# Patient Record
Sex: FEMALE | Race: WHITE | NOT HISPANIC OR LATINO | Employment: UNEMPLOYED | URBAN - METROPOLITAN AREA
[De-identification: names, ages, dates, MRNs, and addresses within clinical notes are randomized per-mention and may not be internally consistent; named-entity substitution may affect disease eponyms.]

---

## 2023-06-05 ENCOUNTER — APPOINTMENT (EMERGENCY)
Dept: RADIOLOGY | Facility: HOSPITAL | Age: 38
End: 2023-06-05
Payer: COMMERCIAL

## 2023-06-05 ENCOUNTER — HOSPITAL ENCOUNTER (EMERGENCY)
Facility: HOSPITAL | Age: 38
Discharge: LEFT BEFORE DISCHARGE | End: 2023-06-05
Attending: STUDENT IN AN ORGANIZED HEALTH CARE EDUCATION/TRAINING PROGRAM
Payer: COMMERCIAL

## 2023-06-05 VITALS
RESPIRATION RATE: 27 BRPM | HEART RATE: 94 BPM | TEMPERATURE: 97.8 F | OXYGEN SATURATION: 99 % | DIASTOLIC BLOOD PRESSURE: 73 MMHG | SYSTOLIC BLOOD PRESSURE: 120 MMHG
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
## 2023-06-05 LAB
ABO GROUP BLD: NORMAL
ALBUMIN SERPL BCP-MCNC: 3.7 G/DL (ref 3.5–5)
ALP SERPL-CCNC: 45 U/L (ref 46–116)
ALT SERPL W P-5'-P-CCNC: 20 U/L (ref 12–78)
ANION GAP SERPL CALCULATED.3IONS-SCNC: 3 MMOL/L (ref 4–13)
APAP SERPL-MCNC: <2 UG/ML (ref 10–20)
AST SERPL W P-5'-P-CCNC: 27 U/L (ref 5–45)
BASE EXCESS BLDA CALC-SCNC: 0 MMOL/L (ref -2–3)
BASOPHILS # BLD AUTO: 0.03 THOUSANDS/ÂΜL (ref 0–0.1)
BASOPHILS NFR BLD AUTO: 0 % (ref 0–1)
BILIRUB SERPL-MCNC: 0.64 MG/DL (ref 0.2–1)
BLD GP AB SCN SERPL QL: NEGATIVE
BUN SERPL-MCNC: 9 MG/DL (ref 5–25)
CA-I BLD-SCNC: 1.11 MMOL/L (ref 1.12–1.32)
CALCIUM SERPL-MCNC: 8.5 MG/DL (ref 8.3–10.1)
CHLORIDE SERPL-SCNC: 112 MMOL/L (ref 96–108)
CO2 SERPL-SCNC: 24 MMOL/L (ref 21–32)
CREAT SERPL-MCNC: 0.85 MG/DL (ref 0.6–1.3)
EOSINOPHIL # BLD AUTO: 0.05 THOUSAND/ÂΜL (ref 0–0.61)
EOSINOPHIL NFR BLD AUTO: 1 % (ref 0–6)
ERYTHROCYTE [DISTWIDTH] IN BLOOD BY AUTOMATED COUNT: 12.4 % (ref 11.6–15.1)
ETHANOL SERPL-MCNC: <3 MG/DL (ref 0–3)
GFR SERPL CREATININE-BSD FRML MDRD: 87 ML/MIN/1.73SQ M
GLUCOSE SERPL-MCNC: 90 MG/DL (ref 65–140)
GLUCOSE SERPL-MCNC: 91 MG/DL (ref 65–140)
HCG SERPL QL: NEGATIVE
HCO3 BLDA-SCNC: 23.1 MMOL/L (ref 24–30)
HCT VFR BLD AUTO: 35.7 % (ref 34.8–46.1)
HCT VFR BLD CALC: 36 % (ref 34.8–46.1)
HGB BLD-MCNC: 12.5 G/DL (ref 11.5–15.4)
HGB BLDA-MCNC: 12.2 G/DL (ref 11.5–15.4)
IMM GRANULOCYTES # BLD AUTO: 0.02 THOUSAND/UL (ref 0–0.2)
IMM GRANULOCYTES NFR BLD AUTO: 0 % (ref 0–2)
LYMPHOCYTES # BLD AUTO: 1.35 THOUSANDS/ÂΜL (ref 0.6–4.47)
LYMPHOCYTES NFR BLD AUTO: 20 % (ref 14–44)
MCH RBC QN AUTO: 31 PG (ref 26.8–34.3)
MCHC RBC AUTO-ENTMCNC: 35 G/DL (ref 31.4–37.4)
MCV RBC AUTO: 89 FL (ref 82–98)
MONOCYTES # BLD AUTO: 0.41 THOUSAND/ÂΜL (ref 0.17–1.22)
MONOCYTES NFR BLD AUTO: 6 % (ref 4–12)
NEUTROPHILS # BLD AUTO: 4.96 THOUSANDS/ÂΜL (ref 1.85–7.62)
NEUTS SEG NFR BLD AUTO: 73 % (ref 43–75)
NRBC BLD AUTO-RTO: 0 /100 WBCS
PCO2 BLD: 24 MMOL/L (ref 21–32)
PCO2 BLD: 31.9 MM HG (ref 42–50)
PH BLD: 7.47 [PH] (ref 7.3–7.4)
PLATELET # BLD AUTO: 172 THOUSANDS/UL (ref 149–390)
PMV BLD AUTO: 10.9 FL (ref 8.9–12.7)
PO2 BLD: 66 MM HG (ref 35–45)
POTASSIUM BLD-SCNC: 3.5 MMOL/L (ref 3.5–5.3)
POTASSIUM SERPL-SCNC: 3.5 MMOL/L (ref 3.5–5.3)
PROT SERPL-MCNC: 7.1 G/DL (ref 6.4–8.4)
RBC # BLD AUTO: 4.03 MILLION/UL (ref 3.81–5.12)
RH BLD: POSITIVE
SALICYLATES SERPL-MCNC: <3 MG/DL (ref 3–20)
SAO2 % BLD FROM PO2: 94 % (ref 60–85)
SODIUM BLD-SCNC: 143 MMOL/L (ref 136–145)
SODIUM SERPL-SCNC: 139 MMOL/L (ref 135–147)
SPECIMEN EXPIRATION DATE: NORMAL
SPECIMEN SOURCE: ABNORMAL
WBC # BLD AUTO: 6.82 THOUSAND/UL (ref 4.31–10.16)

## 2023-06-05 PROCEDURE — 82803 BLOOD GASES ANY COMBINATION: CPT

## 2023-06-05 PROCEDURE — 86850 RBC ANTIBODY SCREEN: CPT | Performed by: STUDENT IN AN ORGANIZED HEALTH CARE EDUCATION/TRAINING PROGRAM

## 2023-06-05 PROCEDURE — 70496 CT ANGIOGRAPHY HEAD: CPT

## 2023-06-05 PROCEDURE — 84295 ASSAY OF SERUM SODIUM: CPT

## 2023-06-05 PROCEDURE — 71260 CT THORAX DX C+: CPT

## 2023-06-05 PROCEDURE — 74177 CT ABD & PELVIS W/CONTRAST: CPT

## 2023-06-05 PROCEDURE — 80053 COMPREHEN METABOLIC PANEL: CPT | Performed by: STUDENT IN AN ORGANIZED HEALTH CARE EDUCATION/TRAINING PROGRAM

## 2023-06-05 PROCEDURE — 36415 COLL VENOUS BLD VENIPUNCTURE: CPT | Performed by: STUDENT IN AN ORGANIZED HEALTH CARE EDUCATION/TRAINING PROGRAM

## 2023-06-05 PROCEDURE — 70498 CT ANGIOGRAPHY NECK: CPT

## 2023-06-05 PROCEDURE — 86900 BLOOD TYPING SEROLOGIC ABO: CPT | Performed by: STUDENT IN AN ORGANIZED HEALTH CARE EDUCATION/TRAINING PROGRAM

## 2023-06-05 PROCEDURE — 80143 DRUG ASSAY ACETAMINOPHEN: CPT | Performed by: STUDENT IN AN ORGANIZED HEALTH CARE EDUCATION/TRAINING PROGRAM

## 2023-06-05 PROCEDURE — 84132 ASSAY OF SERUM POTASSIUM: CPT

## 2023-06-05 PROCEDURE — 76705 ECHO EXAM OF ABDOMEN: CPT | Performed by: PHYSICIAN ASSISTANT

## 2023-06-05 PROCEDURE — 85014 HEMATOCRIT: CPT

## 2023-06-05 PROCEDURE — 93308 TTE F-UP OR LMTD: CPT | Performed by: PHYSICIAN ASSISTANT

## 2023-06-05 PROCEDURE — 99205 OFFICE O/P NEW HI 60 MIN: CPT | Performed by: STUDENT IN AN ORGANIZED HEALTH CARE EDUCATION/TRAINING PROGRAM

## 2023-06-05 PROCEDURE — 76604 US EXAM CHEST: CPT | Performed by: PHYSICIAN ASSISTANT

## 2023-06-05 PROCEDURE — 85025 COMPLETE CBC W/AUTO DIFF WBC: CPT | Performed by: STUDENT IN AN ORGANIZED HEALTH CARE EDUCATION/TRAINING PROGRAM

## 2023-06-05 PROCEDURE — 99284 EMERGENCY DEPT VISIT MOD MDM: CPT

## 2023-06-05 PROCEDURE — 86901 BLOOD TYPING SEROLOGIC RH(D): CPT | Performed by: STUDENT IN AN ORGANIZED HEALTH CARE EDUCATION/TRAINING PROGRAM

## 2023-06-05 PROCEDURE — 82947 ASSAY GLUCOSE BLOOD QUANT: CPT

## 2023-06-05 PROCEDURE — 82330 ASSAY OF CALCIUM: CPT

## 2023-06-05 PROCEDURE — 84703 CHORIONIC GONADOTROPIN ASSAY: CPT | Performed by: STUDENT IN AN ORGANIZED HEALTH CARE EDUCATION/TRAINING PROGRAM

## 2023-06-05 PROCEDURE — 82077 ASSAY SPEC XCP UR&BREATH IA: CPT | Performed by: STUDENT IN AN ORGANIZED HEALTH CARE EDUCATION/TRAINING PROGRAM

## 2023-06-05 PROCEDURE — 73130 X-RAY EXAM OF HAND: CPT

## 2023-06-05 PROCEDURE — 73564 X-RAY EXAM KNEE 4 OR MORE: CPT

## 2023-06-05 PROCEDURE — 71045 X-RAY EXAM CHEST 1 VIEW: CPT

## 2023-06-05 PROCEDURE — 80179 DRUG ASSAY SALICYLATE: CPT | Performed by: STUDENT IN AN ORGANIZED HEALTH CARE EDUCATION/TRAINING PROGRAM

## 2023-06-05 RX ADMIN — IOHEXOL 100 ML: 350 INJECTION, SOLUTION INTRAVENOUS at 15:32
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
## 2023-06-05 NOTE — PROCEDURES
POC FAST US    Date/Time: 6/5/2023 3:43 PM    Performed by: Ryne Ngo PA-C  Authorized by: Ryne Ngo PA-C    Patient location:  Trauma  Procedure details:     Exam Type:  Diagnostic    Indications: blunt abdominal trauma and blunt chest trauma      Assess for:  Intra-abdominal fluid, pericardial effusion and pneumothorax    Technique: extended FAST      Views obtained:  Heart - Pericardial sac, LUQ - Splenorenal space, Suprapubic - Pouch of Kenneth, RUQ - Whitehead's Pouch, Left thorax and Right thorax    Image quality: diagnostic      Image availability:  Images available in PACS and video obtained  FAST Findings:     RUQ (Hepatorenal) free fluid: absent      LUQ (Splenorenal) free fluid: absent      Suprapubic free fluid: absent      Cardiac wall motion: identified      Pericardial effusion: absent    extended FAST (Pulmonary) findings:     Left lung sliding: Present      Right lung sliding: Present    Interpretation:     Impressions: negative
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
## 2023-06-05 NOTE — ED NOTES
AMA formed signed by pt  PD leaving bedside and pt free to leave  All IVs removed by this RN       Adria Allen RN  06/05/23 3793
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
## 2023-06-05 NOTE — TRAUMA DOCUMENTATION
Pt provided with scrub top @ request  Pt then eloped from the dept with IV's intact  [facility] called @ this time

## 2023-06-25 ENCOUNTER — HOSPITAL ENCOUNTER (EMERGENCY)
Facility: HOSPITAL | Age: 38
Discharge: HOME | End: 2023-06-25
Attending: EMERGENCY MEDICINE
Payer: COMMERCIAL

## 2023-06-25 VITALS
OXYGEN SATURATION: 98 % | SYSTOLIC BLOOD PRESSURE: 123 MMHG | OXYGEN SATURATION: 98 % | SYSTOLIC BLOOD PRESSURE: 123 MMHG | HEART RATE: 104 BPM | TEMPERATURE: 98.5 F | HEART RATE: 104 BPM | RESPIRATION RATE: 18 BRPM | RESPIRATION RATE: 18 BRPM | TEMPERATURE: 98.5 F | DIASTOLIC BLOOD PRESSURE: 68 MMHG | DIASTOLIC BLOOD PRESSURE: 68 MMHG

## 2023-06-25 DIAGNOSIS — R10.9 FLANK PAIN: Primary | ICD-10-CM

## 2023-06-25 LAB
BILIRUB UR QL STRIP: NEGATIVE
CLARITY UR: CLEAR
COLOR UR: YELLOW
EXT PREGNANCY TEST URINE: NEGATIVE
EXT PREGNANCY TEST URINE: NEGATIVE
EXT. CONTROL: NORMAL
EXT. CONTROL: NORMAL
GLUCOSE UR STRIP-MCNC: NEGATIVE MG/DL
HGB UR QL STRIP.AUTO: NEGATIVE
KETONES UR STRIP-MCNC: NEGATIVE MG/DL
LEUKOCYTE ESTERASE UR QL STRIP: NEGATIVE
NITRITE UR QL STRIP: NEGATIVE
PH UR STRIP.AUTO: 5 [PH]
PH UR STRIP.AUTO: 5 [PH]
PH UR STRIP.AUTO: 6 [PH] (ref 4.5–8)
PH UR STRIP.AUTO: 6 [PH] (ref 4.5–8)
PROT UR STRIP-MCNC: NEGATIVE MG/DL
SP GR UR STRIP.AUTO: 1.02 (ref 1–1.03)
SP GR UR STRIP.AUTO: 1.02 (ref 1–1.03)
SP GR UR STRIP.AUTO: >=1.03 (ref 1–1.03)
SP GR UR STRIP.AUTO: >=1.03 (ref 1–1.03)
UROBILINOGEN UR QL STRIP.AUTO: 0.2 E.U./DL
UROBILINOGEN UR QL STRIP.AUTO: 0.2 E.U./DL
UROBILINOGEN UR STRIP-ACNC: <2 MG/DL
UROBILINOGEN UR STRIP-ACNC: <2 MG/DL

## 2023-06-25 PROCEDURE — 81003 URINALYSIS AUTO W/O SCOPE: CPT

## 2023-06-25 PROCEDURE — 99283 EMERGENCY DEPT VISIT LOW MDM: CPT

## 2023-06-25 PROCEDURE — 81025 URINE PREGNANCY TEST: CPT

## 2023-06-25 RX ORDER — IBUPROFEN 400 MG/1
400 TABLET ORAL ONCE
Status: DISCONTINUED | OUTPATIENT
Start: 2023-06-25 | End: 2023-06-25 | Stop reason: HOSPADM

## 2023-06-25 RX ORDER — ACETAMINOPHEN 325 MG/1
975 TABLET ORAL ONCE
Status: COMPLETED | OUTPATIENT
Start: 2023-06-25 | End: 2023-06-25

## 2023-06-25 RX ORDER — LIDOCAINE 50 MG/G
2 PATCH TOPICAL DAILY
Qty: 14 PATCH | Refills: 0 | Status: SHIPPED | OUTPATIENT
Start: 2023-06-25 | End: 2023-07-02

## 2023-06-25 RX ADMIN — ACETAMINOPHEN 975 MG: 325 TABLET ORAL at 12:34
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
## 2023-06-25 NOTE — DISCHARGE INSTRUCTIONS
You are seen and evaluated in the emergency department today over your concern of left flank pain  The work-up was negative for urinary tract infection and kidney stone  Please follow-up with your primary care provider provided below
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
## 2023-10-30 ENCOUNTER — HOSPITAL ENCOUNTER (EMERGENCY)
Facility: HOSPITAL | Age: 38
Discharge: HOME | End: 2023-10-30
Attending: EMERGENCY MEDICINE
Payer: COMMERCIAL

## 2023-10-30 ENCOUNTER — APPOINTMENT (EMERGENCY)
Dept: RADIOLOGY | Facility: HOSPITAL | Age: 38
End: 2023-10-30
Payer: COMMERCIAL

## 2023-10-30 VITALS
SYSTOLIC BLOOD PRESSURE: 138 MMHG | OXYGEN SATURATION: 99 % | TEMPERATURE: 97.8 F | HEART RATE: 80 BPM | DIASTOLIC BLOOD PRESSURE: 84 MMHG | RESPIRATION RATE: 18 BRPM

## 2023-10-30 DIAGNOSIS — J06.9 UPPER RESPIRATORY INFECTION: ICD-10-CM

## 2023-10-30 DIAGNOSIS — R05.9 COUGH: Primary | ICD-10-CM

## 2023-10-30 LAB
ATRIAL RATE: 71 BPM
P AXIS: 77 DEGREES
PR INTERVAL: 136 MS
QRS AXIS: 88 DEGREES
QRSD INTERVAL: 72 MS
QT INTERVAL: 412 MS
QTC INTERVAL: 447 MS
T WAVE AXIS: 74 DEGREES
VENTRICULAR RATE: 71 BPM

## 2023-10-30 PROCEDURE — 93010 ELECTROCARDIOGRAM REPORT: CPT | Performed by: INTERNAL MEDICINE

## 2023-10-30 PROCEDURE — 93005 ELECTROCARDIOGRAM TRACING: CPT

## 2023-10-30 PROCEDURE — 99283 EMERGENCY DEPT VISIT LOW MDM: CPT

## 2023-10-30 PROCEDURE — 99284 EMERGENCY DEPT VISIT MOD MDM: CPT | Performed by: EMERGENCY MEDICINE

## 2023-10-30 PROCEDURE — 71045 X-RAY EXAM CHEST 1 VIEW: CPT

## 2023-10-30 RX ORDER — ALBUTEROL SULFATE 90 UG/1
2 AEROSOL, METERED RESPIRATORY (INHALATION) EVERY 4 HOURS PRN
Qty: 96 G | Refills: 0 | Status: SHIPPED | OUTPATIENT
Start: 2023-10-30

## 2023-10-30 RX ORDER — ALBUTEROL SULFATE 90 UG/1
2 AEROSOL, METERED RESPIRATORY (INHALATION) ONCE
Status: COMPLETED | OUTPATIENT
Start: 2023-10-30 | End: 2023-10-30

## 2023-10-30 RX ADMIN — DEXAMETHASONE 6 MG: 4 TABLET ORAL at 12:46

## 2023-10-30 RX ADMIN — ALBUTEROL SULFATE 2 PUFF: 90 AEROSOL, METERED RESPIRATORY (INHALATION) at 12:59

## 2023-10-30 NOTE — ED ATTENDING ATTESTATION
10/30/2023  Cara Chávez MD, saw and evaluated the patient. I have discussed the patient with the resident/non-physician practitioner and agree with the resident's/non-physician practitioner's findings, Plan of Care, and MDM as documented in the resident's/non-physician practitioner's note, except where noted. All available labs and Radiology studies were reviewed. I was present for key portions of any procedure(s) performed by the resident/non-physician practitioner and I was immediately available to provide assistance. At this point I agree with the current assessment done in the Emergency Department.   I have conducted an independent evaluation of this patient a history and physical is as follows:  Pt has histoyr fo chronic bronchitis in past Pt has had progressive cough for greater than 1 week Cough is dry Pt is running out of albuterol no fevers no nvd no throat or ear pain + rhinorrhea PE: alert nad tms clear L with some cerumen pharynx clear heart reg lungs clear MDM: will do cxr albuterol decadron  ED Course         Critical Care Time  Procedures

## 2023-10-30 NOTE — ED PROVIDER NOTES
History  Chief Complaint   Patient presents with    Cough     C/o cough for 4 days, states "I get bronchitis and pneumonia easily" also c/o SOB. 80-year-old female presenting to the emergency department for evaluation of cough and nasal congestion. History of chronic bronchitis. Cough is dry. Patient running out of albuterol. No fevers no chills no headache dizziness tinnitus vision change neck pain back pain numbness tingling in any of her extremities denies any chest pain or shortness of breath. Prior to Admission Medications   Prescriptions Last Dose Informant Patient Reported? Taking?   lidocaine (Lidoderm) 5 %   No No   Sig: Apply 2 patches topically over 12 hours daily for 7 days Remove & Discard patch within 12 hours or as directed by MD      Facility-Administered Medications: None       Past Medical History:   Diagnosis Date    Asthma     Seizures ([address])        History reviewed. No pertinent surgical history. History reviewed. No pertinent family history. I have reviewed and agree with the history as documented. E-Cigarette/Vaping     E-Cigarette/Vaping Substances     Social History     Tobacco Use    Smoking status: Every Day     Packs/day: 0.50     Types: Cigarettes    Smokeless tobacco: Never   Substance Use Topics    Alcohol use: Not Currently    Drug use: Yes     Types: Marijuana        Review of Systems   Constitutional:  Negative for chills and fever. HENT:  Positive for congestion. Negative for ear pain and sore throat. Eyes:  Negative for pain and visual disturbance. Respiratory:  Positive for cough. Negative for shortness of breath. Cardiovascular:  Negative for chest pain and palpitations. Gastrointestinal:  Negative for abdominal pain, constipation, diarrhea, nausea and vomiting. Genitourinary:  Negative for dysuria and hematuria. Musculoskeletal:  Negative for arthralgias and back pain. Skin:  Negative for color change and rash.    Neurological:  Negative for seizures and syncope. Psychiatric/Behavioral:  Negative for agitation and behavioral problems. All other systems reviewed and are negative. Physical Exam  ED Triage Vitals   Temperature Pulse Respirations Blood Pressure SpO2   10/30/23 1220 10/30/23 1219 10/30/23 1219 10/30/23 1219 10/30/23 1219   97.8 °F (36.6 °C) 76 18 129/76 100 %      Temp src Heart Rate Source Patient Position - Orthostatic VS BP Location FiO2 (%)   -- 10/30/23 1230 10/30/23 1230 10/30/23 1219 --    Monitor Sitting Right arm       Pain Score       --                    Orthostatic Vital Signs  Vitals:    10/30/23 1219 10/30/23 1230   BP: 129/76 138/84   Pulse: 76 80   Patient Position - Orthostatic VS:  Sitting       Physical Exam    ED Medications  Medications   dexamethasone (DECADRON) tablet 6 mg (6 mg Oral Given 10/30/23 1246)   albuterol (PROVENTIL HFA,VENTOLIN HFA) inhaler 2 puff (2 puffs Inhalation Given 10/30/23 1259)       Diagnostic Studies  Results Reviewed       None                   XR chest 1 view portable   Final Result by Bev Lu MD (10/30 4270)      No acute cardiopulmonary disease. Workstation performed: [id number]               Procedures  Procedures      ED Course                                       Medical Decision Making  Symptoms consistent with chronic bronchitis versus URI likely viral in etiology. Clinically well hydrated on arrival. No signs of respiratory distress. Chest x-ray performed negative for acute pathology. Dose of steroids given. May use saline spray. Tylenol and motrin recommended as needed for fever. Encourage fluids. Close PCP follow up. Return precautions given        Amount and/or Complexity of Data Reviewed  Radiology: ordered. Risk  Prescription drug management.           Disposition  Final diagnoses:   Cough   Upper respiratory infection     Time reflects when diagnosis was documented in both MDM as applicable and the Disposition within this note       Time User Action Codes Description Comment    10/30/2023 12:45 PM Voncille Beto Add [R05.9] Cough     10/30/2023  1:48 PM Voncille Strayhorn Add [J06.9] Upper respiratory infection           ED Disposition       ED Disposition   Discharge    Condition   Stable    Date/Time   Mon Oct 30, 2023 12:45 PM    Comment   Hunter Newberry discharge to home/self care. Follow-up Information       Follow up With Specialties Details Why Contact Info Additional [address] [facility] Emergency Department Emergency Medicine Go to  As needed, If symptoms worsen [address] [phone]  [facility] Emergency Department, [address] [place], [place], [address] [place] [address] Schedule an appointment as soon as possible for a visit  for follow up [address] [facility], [address] [place] [phone]  [address], [address] [facility], [address] [place] [address]            Discharge Medication List as of 10/30/2023  1:48 PM        START taking these medications    Details   albuterol (ProAir HFA) 90 mcg/act inhaler Inhale 2 puffs every 4 (four) hours as needed for wheezing, Starting Mon 10/30/2023, Normal           CONTINUE these medications which have NOT CHANGED    Details   lidocaine (Lidoderm) 5 % Apply 2 patches topically over 12 hours daily for 7 days Remove & Discard patch within 12 hours or as directed by MD, Starting Sun 6/25/2023, Until Sun 7/2/2023, Print           No discharge procedures on file. PDMP Review       None             ED Provider  Attending physically available and evaluated Hunter Newberry. I managed the patient along with the ED Attending.     Electronically Signed by           Tracy Burch DO  10/31/23 8069

## 2023-11-04 ENCOUNTER — HOSPITAL ENCOUNTER (EMERGENCY)
Facility: HOSPITAL | Age: 38
Discharge: HOME | End: 2023-11-04
Attending: EMERGENCY MEDICINE
Payer: COMMERCIAL

## 2023-11-04 ENCOUNTER — TELEPHONE (OUTPATIENT)
Dept: EMERGENCY DEPT | Facility: HOSPITAL | Age: 38
End: 2023-11-04

## 2023-11-04 VITALS
OXYGEN SATURATION: 100 % | TEMPERATURE: 97.5 F | SYSTOLIC BLOOD PRESSURE: 114 MMHG | HEART RATE: 104 BPM | RESPIRATION RATE: 18 BRPM | DIASTOLIC BLOOD PRESSURE: 84 MMHG

## 2023-11-04 VITALS
HEART RATE: 113 BPM | OXYGEN SATURATION: 98 % | RESPIRATION RATE: 18 BRPM | SYSTOLIC BLOOD PRESSURE: 129 MMHG | TEMPERATURE: 97.6 F | DIASTOLIC BLOOD PRESSURE: 94 MMHG

## 2023-11-04 DIAGNOSIS — J45.909 ASTHMA: ICD-10-CM

## 2023-11-04 DIAGNOSIS — J20.9 ACUTE BRONCHITIS: Primary | ICD-10-CM

## 2023-11-04 DIAGNOSIS — Z76.0 MEDICATION REFILL: Primary | ICD-10-CM

## 2023-11-04 DIAGNOSIS — J45.909 ASTHMA: Primary | ICD-10-CM

## 2023-11-04 DIAGNOSIS — Z87.09 HISTORY OF ASTHMA: ICD-10-CM

## 2023-11-04 LAB
DME PARACHUTE DELIVERY DATE REQUESTED: NORMAL
DME PARACHUTE ITEM DESCRIPTION: NORMAL
DME PARACHUTE ORDER STATUS: NORMAL
DME PARACHUTE SUPPLIER NAME: NORMAL
DME PARACHUTE SUPPLIER PHONE: NORMAL

## 2023-11-04 PROCEDURE — 99284 EMERGENCY DEPT VISIT MOD MDM: CPT | Performed by: EMERGENCY MEDICINE

## 2023-11-04 PROCEDURE — 99281 EMR DPT VST MAYX REQ PHY/QHP: CPT

## 2023-11-04 PROCEDURE — 99285 EMERGENCY DEPT VISIT HI MDM: CPT | Performed by: EMERGENCY MEDICINE

## 2023-11-04 PROCEDURE — 99282 EMERGENCY DEPT VISIT SF MDM: CPT

## 2023-11-04 RX ORDER — ALBUTEROL SULFATE 90 UG/1
2 AEROSOL, METERED RESPIRATORY (INHALATION) EVERY 4 HOURS PRN
Qty: 6.7 G | Refills: 0 | Status: SHIPPED | OUTPATIENT
Start: 2023-11-04

## 2023-11-04 RX ORDER — BUDESONIDE 0.5 MG/2ML
0.5 INHALANT ORAL 2 TIMES DAILY
Qty: 120 ML | Refills: 0 | Status: SHIPPED | OUTPATIENT
Start: 2023-11-04 | End: 2023-12-04

## 2023-11-04 RX ORDER — FLUTICASONE PROPIONATE 50 MCG
1 SPRAY, SUSPENSION (ML) NASAL DAILY
Status: DISCONTINUED | OUTPATIENT
Start: 2023-11-04 | End: 2023-11-04

## 2023-11-04 RX ORDER — PREDNISONE 20 MG/1
40 TABLET ORAL ONCE
Status: COMPLETED | OUTPATIENT
Start: 2023-11-04 | End: 2023-11-04

## 2023-11-04 RX ORDER — PREDNISONE 20 MG/1
40 TABLET ORAL DAILY
Qty: 10 TABLET | Refills: 0 | Status: SHIPPED | OUTPATIENT
Start: 2023-11-04 | End: 2023-11-09

## 2023-11-04 RX ORDER — BUDESONIDE 180 UG/1
2 AEROSOL, POWDER RESPIRATORY (INHALATION) 2 TIMES DAILY
Qty: 1 EACH | Refills: 0 | Status: SHIPPED | OUTPATIENT
Start: 2023-11-04 | End: 2023-11-04

## 2023-11-04 RX ORDER — FLUTICASONE PROPIONATE 50 MCG
1 SPRAY, SUSPENSION (ML) NASAL ONCE
Status: COMPLETED | OUTPATIENT
Start: 2023-11-04 | End: 2023-11-04

## 2023-11-04 RX ADMIN — PREDNISONE 40 MG: 20 TABLET ORAL at 01:24

## 2023-11-04 RX ADMIN — FLUTICASONE PROPIONATE 1 SPRAY: 50 SPRAY, METERED NASAL at 01:42

## 2023-11-04 NOTE — ED ATTENDING ATTESTATION
11/4/2023  ILuis Alberto MD, saw and evaluated the patient. I have discussed the patient with the resident/non-physician practitioner and agree with the resident's/non-physician practitioner's findings, Plan of Care, and MDM as documented in the resident's/non-physician practitioner's note, except where noted. All available labs and Radiology studies were reviewed. I was present for key portions of any procedure(s) performed by the resident/non-physician practitioner and I was immediately available to provide assistance. At this point I agree with the current assessment done in the Emergency Department. I have conducted an independent evaluation of this patient a history and physical is as follows:    ED Course  ED Course as of 11/04/23 0155   Sat Nov 04, 2023   0112 Per resident h&p 44 YO F presents for 2 weeks of cough; rhinorrhea; seen in the ED administered albuterol and dexamethasone; coughing interfering with sleep; feels SOB when coughing; no CP ; appetite NL O: NL PE I/P persistent bronchitis; h/o pulmicort use; repeat corticosteroids oral     Emergency Department Note- Raleigh Lewis 45 y.o. female MRN: [id number]    Unit/Bed#: ED 01 Encounter: [id number]    Raleigh Lewis is a 45 y.o. female who presents with   Chief Complaint   Patient presents with    Cough     Pt reports cough x2 weeks. States she was seen Monday and cough persists          History of Present Illness   HPI:  Raleihg Lewis is a 45 y.o. female who presents for evaluation of: Intermittent cough over the last 2 weeks. Patient was seen in this ED on Monday and treated with corticosteroids and albuterol nebulization therapy. Patient's cough is persisted. Her symptoms are worse at night. Patient notes that she has been wheezing at night. In the past she has been on a corticosteroid inhaler that she has felt this helped her but is not currently on 1. Review of Systems   Constitutional:  Negative for fatigue and fever.    HENT: Negative for congestion and sore throat. Respiratory:  Positive for cough, shortness of breath and wheezing. Cardiovascular:  Negative for chest pain and palpitations. Gastrointestinal:  Negative for abdominal pain and nausea. Genitourinary:  Negative for flank pain and frequency. Neurological:  Negative for light-headedness and headaches. Psychiatric/Behavioral:  Negative for dysphoric mood and hallucinations. All other systems reviewed and are negative. Historical Information   Past Medical History:   Diagnosis Date    Asthma     Seizures ([address])      History reviewed. No pertinent surgical history. Social History   Social History     Substance and Sexual Activity   Alcohol Use Not Currently     Social History     Substance and Sexual Activity   Drug Use Yes    Types: Marijuana     Social History     Tobacco Use   Smoking Status Every Day    Packs/day: 0.50    Types: Cigarettes   Smokeless Tobacco Never     Family History: History reviewed. No pertinent family history. Meds/Allergies   PTA meds:   Prior to Admission Medications   Prescriptions Last Dose Informant Patient Reported? Taking?    albuterol (ProAir HFA) 90 mcg/act inhaler   No No   Sig: Inhale 2 puffs every 4 (four) hours as needed for wheezing   lidocaine (Lidoderm) 5 %   No No   Sig: Apply 2 patches topically over 12 hours daily for 7 days Remove & Discard patch within 12 hours or as directed by MD      Facility-Administered Medications: None     No Known Allergies    Objective   First Vitals:   Blood Pressure: 114/84 (11/04/23 0036)  Pulse: 104 (11/04/23 0036)  Temperature: 97.5 °F (36.4 °C) (11/04/23 0036)  Temp Source: Tympanic (11/04/23 0036)  Respirations: 18 (11/04/23 0036)  SpO2: 100 % (11/04/23 0036)    Current Vitals:   Blood Pressure: 114/84 (11/04/23 0036)  Pulse: 104 (11/04/23 0036)  Temperature: 97.5 °F (36.4 °C) (11/04/23 0036)  Temp Source: Tympanic (11/04/23 0036)  Respirations: 18 (11/04/23 0036)  SpO2: 100 % (23 0036)    No intake or output data in the 24 hours ending 23 0155    Invasive Devices       None                   Physical Exam  Vitals and nursing note reviewed. Constitutional:       General: She is not in acute distress. Appearance: Normal appearance. She is well-developed. HENT:      Head: Normocephalic and atraumatic. Right Ear: External ear normal.      Left Ear: External ear normal.      Nose: Nose normal.      Mouth/Throat:      Pharynx: No oropharyngeal exudate. Eyes:      Conjunctiva/sclera: Conjunctivae normal.      Pupils: Pupils are equal, round, and reactive to light. Cardiovascular:      Rate and Rhythm: Normal rate and regular rhythm. Pulmonary:      Effort: Pulmonary effort is normal. No respiratory distress. Abdominal:      General: Abdomen is flat. There is no distension. Palpations: Abdomen is soft. Musculoskeletal:         General: No deformity. Normal range of motion. Cervical back: Normal range of motion and neck supple. Skin:     General: Skin is warm and dry. Capillary Refill: Capillary refill takes less than 2 seconds. Neurological:      General: No focal deficit present. Mental Status: She is alert and oriented to person, place, and time. Mental status is at baseline. Coordination: Coordination normal.   Psychiatric:         Mood and Affect: Mood normal.         Behavior: Behavior normal.         Thought Content: Thought content normal.         Judgment: Judgment normal.           Medical Decision Makin. Acute asthma exacerbation: Oral corticosteroids; albuterol and ipratropium bromide nebulization therapy. No results found for this or any previous visit (from the past 36 hour(s)). No orders to display         Portions of the record may have been created with voice recognition software.  Occasional wrong word or "sound a like" substitutions may have occurred due to the inherent limitations of voice recognition software. Read the chart carefully and recognize, using context, where substitutions have occurred.         Critical Care Time  Procedures

## 2023-11-04 NOTE — DISCHARGE INSTRUCTIONS
You were seen in the Emergency Department today for need for nebulizer machine. Please follow up with your primary care doctor in a week for re-evaluation. Please return to the Emergency Department if you experience worsening of your current symptoms or any other concerning symptoms including uncontrolled fever, worsening cough, worsening shortness of breath not improved with inhaler and nebulizer.

## 2023-11-04 NOTE — ED PROVIDER NOTES
History  Chief Complaint   Patient presents with    Cough     Pt reports cough x2 weeks. States she was seen Monday and cough persists      HPI  42-year-old female with history of persistent asthma presents to the ED for evaluation of 2 weeks of persistent cough. She was seen here on October 30 for cough and rhinorrhea. She was given albuterol, Decadron. She had a negative chest x-ray. She says that she did fine for about 3 days, but then started to have significant coughing especially at night preventing her from sleeping with associated shortness of breath and wheezing. She has been trying her albuterol inhaler which does help a little bit. She says that she has a diagnosis of asthma and had been on Pulmicort nebulizer in the past, but was lost to follow-up and has just moved back to [place] from [facility] and does not yet have a primary care doctor. She is currently denying fevers, chills, headache, chest pain, chest tightness, shortness of breath, wheezing, abdominal pain, nausea, vomiting, diarrhea, decreased appetite. Prior to Admission Medications   Prescriptions Last Dose Informant Patient Reported? Taking? albuterol (ProAir HFA) 90 mcg/act inhaler   No No   Sig: Inhale 2 puffs every 4 (four) hours as needed for wheezing   lidocaine (Lidoderm) 5 %   No No   Sig: Apply 2 patches topically over 12 hours daily for 7 days Remove & Discard patch within 12 hours or as directed by MD      Facility-Administered Medications: None       Past Medical History:   Diagnosis Date    Asthma     Seizures ([address])        History reviewed. No pertinent surgical history. History reviewed. No pertinent family history. I have reviewed and agree with the history as documented.     E-Cigarette/Vaping     E-Cigarette/Vaping Substances     Social History     Tobacco Use    Smoking status: Every Day     Packs/day: 0.50     Types: Cigarettes    Smokeless tobacco: Never   Substance Use Topics    Alcohol use: Not Currently    Drug use: Yes     Types: Marijuana        Review of Systems  See HPI    Physical Exam  ED Triage Vitals [11/04/23 0036]   Temperature Pulse Respirations Blood Pressure SpO2   97.5 °F (36.4 °C) 104 18 114/84 100 %      Temp Source Heart Rate Source Patient Position - Orthostatic VS BP Location FiO2 (%)   Tympanic Monitor Lying Left arm --      Pain Score       --             Orthostatic Vital Signs  Vitals:    11/04/23 0036   BP: 114/84   Pulse: 104   Patient Position - Orthostatic VS: Lying       Physical Exam  Constitutional:       General: She is not in acute distress. Appearance: She is not ill-appearing. HENT:      Mouth/Throat:      Mouth: Mucous membranes are moist.      Pharynx: Oropharynx is clear. Eyes:      Extraocular Movements: Extraocular movements intact. Conjunctiva/sclera: Conjunctivae normal.   Cardiovascular:      Rate and Rhythm: Normal rate and regular rhythm. Pulses: Normal pulses. Heart sounds: Normal heart sounds. Pulmonary:      Effort: Pulmonary effort is normal.      Breath sounds: Normal breath sounds. Abdominal:      General: There is no distension. Palpations: Abdomen is soft. Tenderness: There is no abdominal tenderness. Musculoskeletal:      Cervical back: Normal range of motion and neck supple. Skin:     General: Skin is warm and dry. Neurological:      Mental Status: She is alert.          ED Medications  Medications   predniSONE tablet 40 mg (40 mg Oral Given 11/4/23 0124)   fluticasone (FLONASE) 50 mcg/act nasal spray 1 spray (1 spray Each Nare Given 11/4/23 0142)       Diagnostic Studies  Results Reviewed       None                   No orders to display         Procedures  Procedures      ED Course                                       Medical Decision Making  1year-old female with history of persistent asthma who is not currently on her Pulmicort with persistent cough and episodes of wheezing and shortness of breath especially at night. Denies fevers or productive cough or other systemic symptoms. Patient with normal VS on presentation. Appears well and NAD. HEENT exam unremarkable with moist mucous membranes. Lungs CTA with good air movement. Heart sounds normal with RRR. Abdominal exam benign. Normal cap refill and equal pulses. Concern for bronchitis on top of only partially treated asthma. History and exam is not consistent with pneumonia. I will give course of prednisone with first dose here as well as Flonase. I will also prescribe patient's Pulmicort inhaler and give information to get set up with primary care doctor. Patient voiced understanding of the plan and all questions were answered. Strict return precautions given. Patient is hemodynamically stable and safe for discharge at this time. Risk  Prescription drug management. Disposition  Final diagnoses:   Acute bronchitis   Asthma     Time reflects when diagnosis was documented in both MDM as applicable and the Disposition within this note       Time User Action Codes Description Comment    11/4/2023  1:20 AM Pandelidis, Othella Cabot Add [J20.9] Acute bronchitis     11/4/2023  1:20 AM Emelia Barrios [D74.511] Asthma           ED Disposition       ED Disposition   Discharge    Condition   Stable    Date/Time   Sat Nov 4, 2023  1:35 AM    Comment   Darryn Husain discharge to home/self care.                    Follow-up Information       Follow up With Specialties Details Why Contact Info Additional [facility] [address] [place] [address] Family Medicine Call   [facility] [phone]  [address], [address], [place], [facility]    [address] [facility]  Call   [phone]               Discharge Medication List as of 11/4/2023  1:35 AM        START taking these medications    Details   !! albuterol (ProAir HFA) 90 mcg/act inhaler Inhale 2 puffs every 4 (four) hours as needed for wheezing, Starting Sat 11/4/2023, Normal      budesonide (Pulmicort Flexhaler) 180 MCG/ACT inhaler Inhale 2 puffs 2 (two) times a day Rinse mouth after use., Starting Sat 11/4/2023, Normal      predniSONE 20 mg tablet Take 2 tablets (40 mg total) by mouth daily for 5 days, Starting Sat 11/4/2023, Until Thu 11/9/2023, Normal       !! - Potential duplicate medications found. Please discuss with provider. CONTINUE these medications which have NOT CHANGED    Details   !! albuterol (ProAir HFA) 90 mcg/act inhaler Inhale 2 puffs every 4 (four) hours as needed for wheezing, Starting Mon 10/30/2023, Normal      lidocaine (Lidoderm) 5 % Apply 2 patches topically over 12 hours daily for 7 days Remove & Discard patch within 12 hours or as directed by MD, Starting Sun 6/25/2023, Until Sun 7/2/2023, Print       !! - Potential duplicate medications found. Please discuss with provider. No discharge procedures on file. PDMP Review       None             ED Provider  Attending physically available and evaluated Lilly Salas. I managed the patient along with the ED Attending.     Electronically Signed by           Nicho Pathak DO  11/04/23 9156

## 2023-11-04 NOTE — ED PROVIDER NOTES
History  Chief Complaint   Patient presents with    Medication Refill     Pt was D/C to get an inhaler. Pt insurance wont cover cost.      HPI    Patient is a 45 y.o. female with PMHx asthma who presents to the ED via private vehicle for nebulizer request. Per chart review, patient was previously on Pulmicort nebulizer previously but was lost to follow up and no longer has nebulizer machine. Patient currently only on albuterol inhaler as needed. She endorses persistent cough x 2 weeks as episodes of wheezing and SOB mervin at night for which she has been using her albuterol inhaler with improvement of symptoms. Per chart review patient had CXR on 10/30/23 which showed no evidence of pneumonia. Patient was evaluated here for those symptoms last night and given first dose of prednisone, sent home on additional 4 days of prednisone and pulmicort inhaler. Patient states insurance will not cover pulmicort inhaler but will cover pulmicort nebulizer solution which she was able to get a prescription for. States she is just here for nebulizer machine. Has no new complaints. Reports using albuterol inhaler an hour prior to coming into the ED. Prior to Admission Medications   Prescriptions Last Dose Informant Patient Reported? Taking? albuterol (ProAir HFA) 90 mcg/act inhaler   No No   Sig: Inhale 2 puffs every 4 (four) hours as needed for wheezing   albuterol (ProAir HFA) 90 mcg/act inhaler   No No   Sig: Inhale 2 puffs every 4 (four) hours as needed for wheezing   budesonide (Pulmicort) 0.5 mg/2 mL nebulizer solution   No No   Sig: Take 2 mL (0.5 mg total) by nebulization 2 (two) times a day Rinse mouth after use.    lidocaine (Lidoderm) 5 %   No No   Sig: Apply 2 patches topically over 12 hours daily for 7 days Remove & Discard patch within 12 hours or as directed by MD   predniSONE 20 mg tablet   No No   Sig: Take 2 tablets (40 mg total) by mouth daily for 5 days      Facility-Administered Medications: None       Past Medical History:   Diagnosis Date    Asthma     Seizures ([address])        History reviewed. No pertinent surgical history. History reviewed. No pertinent family history. I have reviewed and agree with the history as documented. E-Cigarette/Vaping     E-Cigarette/Vaping Substances     Social History     Tobacco Use    Smoking status: Every Day     Packs/day: 0.50     Types: Cigarettes    Smokeless tobacco: Never   Substance Use Topics    Alcohol use: Not Currently    Drug use: Yes     Types: Marijuana        Review of Systems   Respiratory:  Positive for cough, shortness of breath and wheezing. Physical Exam  ED Triage Vitals [11/04/23 1328]   Temperature Pulse Respirations Blood Pressure SpO2   97.6 °F (36.4 °C) (!) 113 18 129/94 98 %      Temp Source Heart Rate Source Patient Position - Orthostatic VS BP Location FiO2 (%)   Temporal Monitor Sitting Left arm --      Pain Score       No Pain             Orthostatic Vital Signs  Vitals:    11/04/23 1328   BP: 129/94   Pulse: (!) 113   Patient Position - Orthostatic VS: Sitting       Physical Exam  Vitals and nursing note reviewed. Constitutional:       General: She is not in acute distress. Appearance: Normal appearance. She is well-developed. She is not ill-appearing, toxic-appearing or diaphoretic. HENT:      Head: Normocephalic and atraumatic. Eyes:      Conjunctiva/sclera: Conjunctivae normal.   Cardiovascular:      Rate and Rhythm: Regular rhythm. Tachycardia present. Heart sounds: No murmur heard. Pulmonary:      Effort: Pulmonary effort is normal. No respiratory distress. Breath sounds: Normal breath sounds. No stridor. No wheezing, rhonchi or rales. Abdominal:      Palpations: Abdomen is soft. Tenderness: There is no abdominal tenderness. Musculoskeletal:         General: No swelling. Cervical back: Neck supple. Skin:     General: Skin is warm and dry.       Capillary Refill: Capillary refill takes less than 2 seconds. Neurological:      General: No focal deficit present. Mental Status: She is alert and oriented to person, place, and time. Psychiatric:         Mood and Affect: Mood normal.         ED Medications  Medications - No data to display    Diagnostic Studies  Results Reviewed       None                   No orders to display         Procedures  Procedures      ED Course                             SBIRT 20yo+      Flowsheet Row Most Recent Value   Initial Alcohol Screen: US AUDIT-C     1. How often do you have a drink containing alcohol? 0 Filed at: 11/04/2023 1329   2. How many drinks containing alcohol do you have on a typical day you are drinking? 0 Filed at: 11/04/2023 1329   3a. Male UNDER 65: How often do you have five or more drinks on one occasion? 0 Filed at: 11/04/2023 1329   3b. FEMALE Any Age, or MALE 65+: How often do you have 4 or more drinks on one occassion? 0 Filed at: 11/04/2023 1329   Audit-C Score 0 Filed at: 11/04/2023 1329   MINI: How many times in the past year have you. .. Used an illegal drug or used a prescription medication for non-medical reasons? Never Filed at: 11/04/2023 1329                  Medical Decision Making    ASSESSMENT: Patient is a 45 y.o. female who presents with request for nebulizer machine. DDX includes but not limited to: asthma exacerbation. PLAN: provide nebulizer machine. Stable for discharge. Strict return to ED precautions provided. Advised to follow up with PCP within a week for re-evaluation and further management. Patient verbalized understanding and agrees with the plan of care.          Disposition  Final diagnoses:   Medication refill   History of asthma     Time reflects when diagnosis was documented in both MDM as applicable and the Disposition within this note       Time User Action Codes Description Comment    11/4/2023  1:44 PM Plummer Delay T Add [Z76.0] Medication refill     11/4/2023  1:45 PM Plummer Delay T Add [Z87.09] History of asthma           ED Disposition       ED Disposition   Discharge    Condition   Stable    Date/Time   Sat Nov 4, 2023 1344    Comment   [address] Carissa Hernandez discharge to home/self care. Follow-up Information       Follow up With Specialties Details Why Contact Info Additional [address] [facility] Emergency Department Emergency Medicine  If symptoms worsen [address] [phone]  [facility] Emergency Department, [address] [place] [address],[address] [place] [phone]             Discharge Medication List as of 11/4/2023  1:51 PM        CONTINUE these medications which have NOT CHANGED    Details   !! albuterol (ProAir HFA) 90 mcg/act inhaler Inhale 2 puffs every 4 (four) hours as needed for wheezing, Starting Mon 10/30/2023, Normal      !! albuterol (ProAir HFA) 90 mcg/act inhaler Inhale 2 puffs every 4 (four) hours as needed for wheezing, Starting Sat 11/4/2023, Normal      budesonide (Pulmicort) 0.5 mg/2 mL nebulizer solution Take 2 mL (0.5 mg total) by nebulization 2 (two) times a day Rinse mouth after use., Starting Sat 11/4/2023, Until Mon 12/4/2023, Normal      lidocaine (Lidoderm) 5 % Apply 2 patches topically over 12 hours daily for 7 days Remove & Discard patch within 12 hours or as directed by MD, Starting Sun 6/25/2023, Until Sun 7/2/2023, Print      predniSONE 20 mg tablet Take 2 tablets (40 mg total) by mouth daily for 5 days, Starting Sat 11/4/2023, Until Thu 11/9/2023, Normal       !! - Potential duplicate medications found. Please discuss with provider. No discharge procedures on file. PDMP Review       None             ED Provider  Attending physically available and evaluated [address] Carissa Hernandez. I managed the patient along with the ED Attending.     Electronically Signed by           Elian Smith MD  11/04/23 [address]

## 2023-11-04 NOTE — DISCHARGE INSTRUCTIONS
You likely have acute bronchitis that is exacerbating your asthma. I will be giving you your Pulmicort inhaler to use twice a day. I will also be giving you a short course of steroids. You should take them as prescribed daily starting tomorrow evening. You should also use Flonase daily until your symptoms resolved. I also have attached 2 numbers for you to call to get set up with a primary care doctor. You only need to schedule an appointment with one of them. You should return to the emergency room if you cannot catch your breath even at rest, if you begin coughing up copious amounts of mucus, or if you develop fevers.

## 2023-11-06 NOTE — ED ATTENDING ATTESTATION
11/4/2023  IKenrick MD, saw and evaluated the patient. I have discussed the patient with the resident/non-physician practitioner and agree with the resident's/non-physician practitioner's findings, Plan of Care, and MDM as documented in the resident's/non-physician practitioner's note, except where noted. All available labs and Radiology studies were reviewed. I was present for key portions of any procedure(s) performed by the resident/non-physician practitioner and I was immediately available to provide assistance. At this point I agree with the current assessment done in the Emergency Department. I have conducted an independent evaluation of this patient a history and physical is as follows:    ED Course     Patient presents with inability to obtain medications recently seen and evaluated for same. Resident was able to facilitate providing nebulizer machine to patient the patient may take her medications as prescribed by prior provider. Patient has no other additional medical complaints at this time and discharged home follow-up with PCP as outpatient.         Critical Care Time  Procedures

## 2023-12-18 ENCOUNTER — HOSPITAL ENCOUNTER (EMERGENCY)
Facility: HOSPITAL | Age: 38
Discharge: HOME | End: 2023-12-18
Attending: EMERGENCY MEDICINE | Admitting: EMERGENCY MEDICINE

## 2023-12-18 VITALS
DIASTOLIC BLOOD PRESSURE: 70 MMHG | WEIGHT: 145 LBS | TEMPERATURE: 98.3 F | OXYGEN SATURATION: 98 % | RESPIRATION RATE: 18 BRPM | HEART RATE: 94 BPM | BODY MASS INDEX: 23.3 KG/M2 | HEIGHT: 66 IN | SYSTOLIC BLOOD PRESSURE: 109 MMHG

## 2023-12-18 DIAGNOSIS — J06.9 VIRAL URI WITH COUGH: Primary | ICD-10-CM

## 2023-12-18 LAB
ATRIAL RATE: 123 BPM
P AXIS: 72 DEGREES
PR INTERVAL: 128 MS
QRS AXIS: 83 DEGREES
QRSD INTERVAL: 72 MS
QT INTERVAL: 430 MS
QTC INTERVAL: 615 MS
T WAVE AXIS: 62 DEGREES
VENTRICULAR RATE: 123 BPM

## 2023-12-18 PROCEDURE — 99284 EMERGENCY DEPT VISIT MOD MDM: CPT | Performed by: EMERGENCY MEDICINE

## 2023-12-18 PROCEDURE — 96374 THER/PROPH/DIAG INJ IV PUSH: CPT

## 2023-12-18 PROCEDURE — 93005 ELECTROCARDIOGRAM TRACING: CPT

## 2023-12-18 PROCEDURE — 99283 EMERGENCY DEPT VISIT LOW MDM: CPT

## 2023-12-18 PROCEDURE — 96361 HYDRATE IV INFUSION ADD-ON: CPT

## 2023-12-18 RX ORDER — ACETAMINOPHEN 325 MG/1
650 TABLET ORAL ONCE
Status: COMPLETED | OUTPATIENT
Start: 2023-12-18 | End: 2023-12-18

## 2023-12-18 RX ORDER — BENZONATATE 100 MG/1
100 CAPSULE ORAL EVERY 8 HOURS
Qty: 21 CAPSULE | Refills: 0 | Status: SHIPPED | OUTPATIENT
Start: 2023-12-18

## 2023-12-18 RX ORDER — IBUPROFEN 400 MG/1
400 TABLET ORAL ONCE
Status: COMPLETED | OUTPATIENT
Start: 2023-12-18 | End: 2023-12-18

## 2023-12-18 RX ORDER — ONDANSETRON 2 MG/ML
4 INJECTION INTRAMUSCULAR; INTRAVENOUS ONCE
Status: COMPLETED | OUTPATIENT
Start: 2023-12-18 | End: 2023-12-18

## 2023-12-18 RX ADMIN — ONDANSETRON 4 MG: 2 INJECTION INTRAMUSCULAR; INTRAVENOUS at 13:04

## 2023-12-18 RX ADMIN — IBUPROFEN 400 MG: 400 TABLET, FILM COATED ORAL at 13:04

## 2023-12-18 RX ADMIN — SODIUM CHLORIDE 1000 ML: 0.9 INJECTION, SOLUTION INTRAVENOUS at 13:04

## 2023-12-18 RX ADMIN — ACETAMINOPHEN 650 MG: 325 TABLET, FILM COATED ORAL at 13:04

## 2023-12-18 NOTE — ED PROVIDER NOTES
History  Chief Complaint   Patient presents with    Cough     Pt c/o generalized body aches, cough and lower abd pain     Patient is a 38F pmh asthma burning up, myalgias, headaches, cough, sore throat. No home meds besides inhaler. Sxns started yesterday. Got covid + flu vaccines. Advil last night. No scik contacts. Mild nausea. Poor po intake. ROS otherwise negative   Using vicks at home        Prior to Admission Medications   Prescriptions Last Dose Informant Patient Reported? Taking?   albuterol (ProAir HFA) 90 mcg/act inhaler   No No   Sig: Inhale 2 puffs every 4 (four) hours as needed for wheezing   albuterol (ProAir HFA) 90 mcg/act inhaler   No No   Sig: Inhale 2 puffs every 4 (four) hours as needed for wheezing   budesonide (Pulmicort) 0.5 mg/2 mL nebulizer solution   No No   Sig: Take 2 mL (0.5 mg total) by nebulization 2 (two) times a day Rinse mouth after use.   lidocaine (Lidoderm) 5 %   No No   Sig: Apply 2 patches topically over 12 hours daily for 7 days Remove & Discard patch within 12 hours or as directed by MD      Facility-Administered Medications: None       Past Medical History:   Diagnosis Date    Asthma     Seizures (HCC)        History reviewed. No pertinent surgical history.    History reviewed. No pertinent family history.  I have reviewed and agree with the history as documented.    E-Cigarette/Vaping     E-Cigarette/Vaping Substances     Social History     Tobacco Use    Smoking status: Every Day     Current packs/day: 0.50     Types: Cigarettes    Smokeless tobacco: Never   Substance Use Topics    Alcohol use: Not Currently    Drug use: Yes     Types: Marijuana        Review of Systems   All other systems reviewed and are negative.      Physical Exam  ED Triage Vitals   Temperature Pulse Respirations Blood Pressure SpO2   12/18/23 1207 12/18/23 1208 12/18/23 1208 12/18/23 1208 12/18/23 1207   98.3 °F (36.8 °C) (!) 128 18 109/70 96 %      Temp Source Heart Rate Source Patient Position -  Orthostatic VS BP Location FiO2 (%)   12/18/23 1207 12/18/23 1207 12/18/23 1208 12/18/23 1208 --   Temporal Monitor Sitting Right arm       Pain Score       12/18/23 1421       5             Orthostatic Vital Signs  Vitals:    12/18/23 1208 12/18/23 1421   BP: 109/70    Pulse: (!) 128 94   Patient Position - Orthostatic VS: Sitting        Physical Exam  Vitals and nursing note reviewed.   Constitutional:       General: She is not in acute distress.     Appearance: She is not ill-appearing.   HENT:      Head: Normocephalic and atraumatic.      Nose: Congestion present.      Mouth/Throat:      Mouth: Mucous membranes are moist.      Pharynx: Oropharynx is clear. No oropharyngeal exudate.   Cardiovascular:      Rate and Rhythm: Normal rate and regular rhythm.   Pulmonary:      Effort: Pulmonary effort is normal. No respiratory distress.      Breath sounds: Normal breath sounds.      Comments: Coughing in the exam room  Abdominal:      General: Abdomen is flat. Bowel sounds are normal.      Palpations: Abdomen is soft.   Musculoskeletal:         General: Normal range of motion.      Cervical back: Normal range of motion.   Skin:     General: Skin is warm and dry.   Neurological:      General: No focal deficit present.      Mental Status: She is alert and oriented to person, place, and time.   Psychiatric:         Mood and Affect: Mood normal.         ED Medications  Medications   acetaminophen (TYLENOL) tablet 650 mg (650 mg Oral Given 12/18/23 1304)   ibuprofen (MOTRIN) tablet 400 mg (400 mg Oral Given 12/18/23 1304)   ondansetron (ZOFRAN) injection 4 mg (4 mg Intravenous Given 12/18/23 1304)   sodium chloride 0.9 % bolus 1,000 mL (0 mL Intravenous Stopped 12/18/23 1459)       Diagnostic Studies  Results Reviewed       None                   No orders to display         Procedures  Procedures      ED Course                             SBIRT 22yo+      Flowsheet Row Most Recent Value   Initial Alcohol Screen: US AUDIT-C      1. How often do you have a drink containing alcohol? 0 Filed at: 12/18/2023 1208   2. How many drinks containing alcohol do you have on a typical day you are drinking?  0 Filed at: 12/18/2023 1208   3b. FEMALE Any Age, or MALE 65+: How often do you have 4 or more drinks on one occassion? 0 Filed at: 12/18/2023 1208   Audit-C Score 0 Filed at: 12/18/2023 1208   MINI: How many times in the past year have you...    Used an illegal drug or used a prescription medication for non-medical reasons? Never Filed at: 12/18/2023 1208                  Medical Decision Making  Patient is presenting with symptoms highly consistent with viral URI.  No meningismus, headache, altered mental status to indicate severe or CNS infection.  Recommending patient take Tylenol and Motrin, in addition to antitussives such as Tessalon Perles for her symptoms.  Feeling improved after Tylenol, Motrin, and a liter bolus of fluids.  Patient will be discharged to follow-up with her primary care provider, with return precautions discussed.  All questions answered prior to discharge.    Risk  OTC drugs.  Prescription drug management.          Disposition  Final diagnoses:   Viral URI with cough     Time reflects when diagnosis was documented in both MDM as applicable and the Disposition within this note       Time User Action Codes Description Comment    12/18/2023 12:34 PM Palomo Velazquez Add [J06.9] Viral URI with cough           ED Disposition       ED Disposition   Discharge    Condition   Stable    Date/Time   Mon Dec 18, 2023 1234    Comment   Sanjana Winchester discharge to home/self care.                   Follow-up Information    None         Discharge Medication List as of 12/18/2023  2:18 PM        START taking these medications    Details   benzonatate (TESSALON PERLES) 100 mg capsule Take 1 capsule (100 mg total) by mouth every 8 (eight) hours, Starting Mon 12/18/2023, Normal           CONTINUE these medications which have NOT CHANGED     Details   !! albuterol (ProAir HFA) 90 mcg/act inhaler Inhale 2 puffs every 4 (four) hours as needed for wheezing, Starting Mon 10/30/2023, Normal      !! albuterol (ProAir HFA) 90 mcg/act inhaler Inhale 2 puffs every 4 (four) hours as needed for wheezing, Starting Sat 11/4/2023, Normal      budesonide (Pulmicort) 0.5 mg/2 mL nebulizer solution Take 2 mL (0.5 mg total) by nebulization 2 (two) times a day Rinse mouth after use., Starting Sat 11/4/2023, Until Mon 12/4/2023, Normal      lidocaine (Lidoderm) 5 % Apply 2 patches topically over 12 hours daily for 7 days Remove & Discard patch within 12 hours or as directed by MD, Starting Sun 6/25/2023, Until Sun 7/2/2023, Print       !! - Potential duplicate medications found. Please discuss with provider.        No discharge procedures on file.    PDMP Review       None             ED Provider  Attending physically available and evaluated Sanjana Winchester. I managed the patient along with the ED Attending.    Electronically Signed by           Palomo Velazquez MD  12/18/23 7833

## 2023-12-18 NOTE — ED ATTENDING ATTESTATION
12/18/2023  I, Valentina Berry MD, saw and evaluated the patient. I have discussed the patient with the resident/non-physician practitioner and agree with the resident's/non-physician practitioner's findings, Plan of Care, and MDM as documented in the resident's/non-physician practitioner's note, except where noted. All available labs and Radiology studies were reviewed.  I was present for key portions of any procedure(s) performed by the resident/non-physician practitioner and I was immediately available to provide assistance.       At this point I agree with the current assessment done in the Emergency Department.  I have conducted an independent evaluation of this patient a history and physical is as follows:  Pt with cough sore throat diffuse myalgias since last pm no vd + nausea PE; alert heart reg lungs clear pharynx no exudates Pt has been ill for approx 24 hours MDM: will give fluids symptomatic treatment    ED Course         Critical Care Time  Procedures

## 2023-12-18 NOTE — DISCHARGE INSTRUCTIONS
You have been evaluated in the emergency department for cough, congestion, fever.  Take Tessalon Perles honey with tea as needed for your cough.  Take Tylenol or Motrin as needed for fevers.  Please follow-up with your primary care provider.

## 2024-05-12 ENCOUNTER — HOSPITAL ENCOUNTER (EMERGENCY)
Facility: HOSPITAL | Age: 39
Discharge: HOME HEALTH | End: 2024-05-12
Attending: EMERGENCY MEDICINE

## 2024-05-12 VITALS
OXYGEN SATURATION: 100 % | RESPIRATION RATE: 18 BRPM | WEIGHT: 150 LBS | DIASTOLIC BLOOD PRESSURE: 97 MMHG | HEART RATE: 92 BPM | TEMPERATURE: 98.1 F | SYSTOLIC BLOOD PRESSURE: 130 MMHG | BODY MASS INDEX: 24.21 KG/M2

## 2024-05-12 DIAGNOSIS — K04.7 DENTAL INFECTION: Primary | ICD-10-CM

## 2024-05-12 DIAGNOSIS — K08.89 DENTALGIA: ICD-10-CM

## 2024-05-12 PROCEDURE — 99284 EMERGENCY DEPT VISIT MOD MDM: CPT | Performed by: EMERGENCY MEDICINE

## 2024-05-12 PROCEDURE — 99282 EMERGENCY DEPT VISIT SF MDM: CPT

## 2024-05-12 RX ORDER — AMOXICILLIN 500 MG/1
500 CAPSULE ORAL EVERY 12 HOURS SCHEDULED
Qty: 20 CAPSULE | Refills: 0 | Status: SHIPPED | OUTPATIENT
Start: 2024-05-12 | End: 2024-05-22

## 2024-05-12 RX ORDER — CHLORHEXIDINE GLUCONATE ORAL RINSE 1.2 MG/ML
15 SOLUTION DENTAL 2 TIMES DAILY
Qty: 120 ML | Refills: 0 | Status: SHIPPED | OUTPATIENT
Start: 2024-05-12

## 2024-05-12 NOTE — ED PROVIDER NOTES
History  Chief Complaint   Patient presents with    Dental Pain     Pt has c/o L lower dental pain for 2 weeks but is worse the last couple of days     Patient is a 39-year-old female with past medical history of asthma, seizures presenting for dental pain.  Patient states that she has had multiple dental infections in the same area which were treated with antibiotics and resolved.  She states that she had oral surgery to remove a cracked tooth in that area in November.  Patient states that she has been having dull pain in that area for several weeks and over the past week that has been progressively getting worse.  In the last few days she noticed some swelling on the outside of her jaw.  Patient denies trismus, difficulty with secretions, fever, chills, diaphoresis, chest pain, shortness of breath, abdominal pain, nausea, vomiting, numbness, tingling, or weakness.        Prior to Admission Medications   Prescriptions Last Dose Informant Patient Reported? Taking?   albuterol (ProAir HFA) 90 mcg/act inhaler   No No   Sig: Inhale 2 puffs every 4 (four) hours as needed for wheezing   albuterol (ProAir HFA) 90 mcg/act inhaler   No No   Sig: Inhale 2 puffs every 4 (four) hours as needed for wheezing   benzonatate (TESSALON PERLES) 100 mg capsule   No No   Sig: Take 1 capsule (100 mg total) by mouth every 8 (eight) hours   budesonide (Pulmicort) 0.5 mg/2 mL nebulizer solution   No No   Sig: Take 2 mL (0.5 mg total) by nebulization 2 (two) times a day Rinse mouth after use.   lidocaine (Lidoderm) 5 %   No No   Sig: Apply 2 patches topically over 12 hours daily for 7 days Remove & Discard patch within 12 hours or as directed by MD      Facility-Administered Medications: None       Past Medical History:   Diagnosis Date    Asthma     Seizures (HCC)        History reviewed. No pertinent surgical history.    History reviewed. No pertinent family history.  I have reviewed and agree with the history as  documented.    E-Cigarette/Vaping    E-Cigarette Use Never User      E-Cigarette/Vaping Substances     Social History     Tobacco Use    Smoking status: Every Day     Current packs/day: 0.50     Types: Cigarettes    Smokeless tobacco: Never   Vaping Use    Vaping status: Never Used   Substance Use Topics    Alcohol use: Not Currently    Drug use: Yes     Types: Marijuana        Review of Systems    Physical Exam  ED Triage Vitals [05/12/24 1501]   Temperature Pulse Respirations Blood Pressure SpO2   98.1 °F (36.7 °C) 92 18 130/97 100 %      Temp Source Heart Rate Source Patient Position - Orthostatic VS BP Location FiO2 (%)   Temporal Monitor Sitting Left arm --      Pain Score       8             Orthostatic Vital Signs  Vitals:    05/12/24 1501   BP: 130/97   Pulse: 92   Patient Position - Orthostatic VS: Sitting       Physical Exam  Vitals and nursing note reviewed.   Constitutional:       General: She is not in acute distress.     Appearance: Normal appearance. She is not ill-appearing, toxic-appearing or diaphoretic.   HENT:      Head: Atraumatic.      Mouth/Throat:      Mouth: Mucous membranes are moist.      Pharynx: Oropharynx is clear. No oropharyngeal exudate or posterior oropharyngeal erythema.      Comments: Sublingual and submental spaces soft.  No area of swelling or fluctuance visualized or palpated within the mouth.  Cardiovascular:      Rate and Rhythm: Normal rate.   Pulmonary:      Effort: Pulmonary effort is normal. No respiratory distress.   Musculoskeletal:      Cervical back: Normal range of motion and neck supple. No rigidity or tenderness.   Lymphadenopathy:      Cervical: No cervical adenopathy.   Skin:     General: Skin is warm and dry.   Neurological:      General: No focal deficit present.      Mental Status: She is alert and oriented to person, place, and time.      Cranial Nerves: No cranial nerve deficit.      Sensory: No sensory deficit.      Motor: No weakness.         ED  Medications  Medications - No data to display    Diagnostic Studies  Results Reviewed       None                   No orders to display         Procedures  Procedures      ED Course                             SBIRT 22yo+      Flowsheet Row Most Recent Value   Initial Alcohol Screen: US AUDIT-C     1. How often do you have a drink containing alcohol? 0 Filed at: 05/12/2024 1503   2. How many drinks containing alcohol do you have on a typical day you are drinking?  0 Filed at: 05/12/2024 1503   3a. Male UNDER 65: How often do you have five or more drinks on one occasion? 0 Filed at: 05/12/2024 1503   3b. FEMALE Any Age, or MALE 65+: How often do you have 4 or more drinks on one occassion? 0 Filed at: 05/12/2024 1503   Audit-C Score 0 Filed at: 05/12/2024 1503   MINI: How many times in the past year have you...    Used an illegal drug or used a prescription medication for non-medical reasons? Never Filed at: 05/12/2024 1503                  Medical Decision Making  Patient is a 39-year-old female presenting with dental pain.    Differential includes dental infection, deep space infection, parotitis, sialoadenitis.  Clinical signs and symptoms point towards dental infection.  Patient has no systemic symptoms and no risk factors for immunocompromise.  Patient will therefore be treated with antibiotics and shared decision making was made surrounding not obtaining a CT scan.    Patient was cleared for discharge with PCP follow-up, dental clinic numbers, and return precautions.    Risk  Prescription drug management.          Disposition  Final diagnoses:   Dental infection   Dentalgia     Time reflects when diagnosis was documented in both MDM as applicable and the Disposition within this note       Time User Action Codes Description Comment    5/12/2024  3:33 PM Juan Carlos Christianson Add [K04.7] Dental infection     5/12/2024  3:33 PM Juan Carlos Christianson Add [K08.89] Dentalgia           ED Disposition       ED Disposition    Discharge    Condition   --    Date/Time   Sun May 12, 2024 1548    Comment   Sanjana Winchester discharge to home/self care.                   Follow-up Information       Follow up With Specialties Details Why Contact Info Additional Information    [facility] Emergency Department Emergency Medicine   [address] [facility] [address] [phone] [facility] Emergency Department, [address], [place], [address] [phone]            Discharge Medication List as of 5/12/2024  3:40 PM        START taking these medications    Details   amoxicillin (AMOXIL) 500 mg capsule Take 1 capsule (500 mg total) by mouth every 12 (twelve) hours for 10 days, Starting Sun 5/12/2024, Until Wed 5/22/2024, Normal      chlorhexidine (PERIDEX) 0.12 % solution Apply 15 mL to the mouth or throat 2 (two) times a day, Starting Sun 5/12/2024, Normal           CONTINUE these medications which have NOT CHANGED    Details   !! albuterol (ProAir HFA) 90 mcg/act inhaler Inhale 2 puffs every 4 (four) hours as needed for wheezing, Starting Mon 10/30/2023, Normal      !! albuterol (ProAir HFA) 90 mcg/act inhaler Inhale 2 puffs every 4 (four) hours as needed for wheezing, Starting Sat 11/4/2023, Normal      benzonatate (TESSALON PERLES) 100 mg capsule Take 1 capsule (100 mg total) by mouth every 8 (eight) hours, Starting Mon 12/18/2023, Normal      budesonide (Pulmicort) 0.5 mg/2 mL nebulizer solution Take 2 mL (0.5 mg total) by nebulization 2 (two) times a day Rinse mouth after use., Starting Sat 11/4/2023, Until Mon 12/4/2023, Normal      lidocaine (Lidoderm) 5 % Apply 2 patches topically over 12 hours daily for 7 days Remove & Discard patch within 12 hours or as directed by MD, Starting Sun 6/25/2023, Until Sun 7/2/2023, Print       !! - Potential duplicate medications found. Please discuss with provider.        No discharge procedures on file.    PDMP Review       None              ED Provider  Attending physically available and evaluated Sanjana Winchester. I managed the patient along with the ED Attending.    Electronically Signed by           Juan Carlos Christianson MD  05/12/24 6001

## 2024-05-12 NOTE — DISCHARGE INSTRUCTIONS
Please use the following pain medications as prescribed:  - Tylenol 650mg every 6 hours  - Motrin 400mg every 6 hours  - Orajel OTC  They work in different ways so can be used together at the same time.     We will give a script for peridex wash. This is an anti-septic which will sterilize the area and prevent and infection from occurring. This can only be used for 2-3 weeks. If you use it longer, it might cause some pink staining of your teeth. It works like a super powered form of Listerine.     Lastly, I am starting you on antibiotics. I would like you to take the full course as prescribed. This should help the pain and swelling.     If despite the above you have worsening symptoms please return to the emergency room for re-evaluation.     If you HAVE A DENTIST, please call to make an appointment with them for follow-up as soon as possible.  If you DO NOT have a dentist, please call [phone]. It is a service that will help you find a dentist in your area and based on your insurance (if you have it or not) after you answer some questions    You may also follow-up with the [facility] if you don't have a density.   Dental Clinic - [facility]  [address] [place] [address]  [phone]  They will see you with or without insurance and have walk in hours in the morning without appointments.     Other local dental clinics include:   DENTAL CLINICS    [facility]  [address] [place] [address]  [phone]  Walk in house M-F 8a-noon  Emergency Dental Care    [facility]  [address], [address]nd [place] [address]  [phone]    Dr. Mickey Judge  [address] [place] [address]  Takes adults & children on a waiting list    [facility]  [address] [place] [address]  [phone]  Walk in scheduled only M-F 8a-noon & 1p-4p  Uninsured received  40% discount & payments  Payment must be made upfront before the service  Emergency Dental Care    [facility]  [address]  [place] [address]  OR  [address] [place] [address]  [phone]    [facility]  [facility]  [phone]

## 2024-05-12 NOTE — ED ATTENDING ATTESTATION
Final Diagnoses:     1. Dental infection    2. Dentalgia           Samuel FREED MD, saw and evaluated the patient. All available labs and X-rays were ordered by me or the resident / non-physician and have been reviewed by myself. I discussed the patient with the resident / non-physician and agree with the resident's / non-physician practitioner's findings and plan as documented in the resident's / non-physician practicitioner's note, except where noted.   At this point, I agree with the current assessment done in the ED.   I was present during key portions of all procedures performed unless otherwise stated.     HPI:  NURSING TRIAGE:    This is a 39 y.o. female presenting for evaluation of 2 weeks of LEFT lower dental pain. Multiple infections there, recent removal of a tooth in Nov 2023.  Couple days of worsening swelling  Can't eat b/c of the pain  No trismus  No trouble handling oral secretions  Primary pain syndrome so is here  No f/ch/n/v/cp/sob.    PMH: no immunocomprimised status.    Chief Complaint   Patient presents with    Dental Pain     Pt has c/o L lower dental pain for 2 weeks but is worse the last couple of days      PHYSICAL: ASSESSMENT + PLAN:   Pertinent: tooth 17/18 missing  No clear fluid collection noted  External swelling noted   No lymph nodes  No trismus  Normal voice  Minimal facial swelling  EOMI  No signs of entrapemtn     General: VS reviewed  Appears in NAD  awake, alert.   Well-nourished, well-developed. Appears stated age.   Speaking normally in full sentences.   Head: Normocephalic, atraumatic  Eyes: EOM-I. No diplopia.   No hyphema.   No subconjunctival hemorrhages.  Symmetrical lids.   ENT: Atraumatic external nose and ears.    MMM  No malocclusion. No stridor. Normal phonation. No drooling. Normal swallowing.   Neck: No JVD.  CV: No pallor noted  Lungs:   No tachypnea  No respiratory distress  MSK:   FROM spontaneously  Skin: Dry, intact.   Neuro: Awake, alert, GCS15, CN  II-XII grossly intact.   Motor grossly intact.  Psychiatric/Behavioral: interacting normally; appropriate mood/affect.    Exam: deferred    Vitals:    05/12/24 1501   BP: 130/97   BP Location: Left arm   Pulse: 92   Resp: 18   Temp: 98.1 °F (36.7 °C)   TempSrc: Temporal   SpO2: 100%   Weight: 68 kg (150 lb)    - We will give tylenol/motrin for pain.  - We will write for chlorhexidine to sterilize the area and prevent further worsening of infection or repeat infection.  - Given the appearance, we will also do antibiotics:  [    ] PCN 500mg QID  [ x ] Amoxicillin 20-40mg/kg/day TID  [    ] Augmentin 20mg/kg BID  [    ] Clindamycin (PCN allergy or worsening infection after 72 hours of amoxicillin) 8-20mg/kg/day TID  [    ] Azithromycin (alternative to clindamycin) 5-12mg/kg Qday  [    ] Metronidazole (30mg/kg/day QID) + Amoxicillin if suspected resistant infection  - The patient will follow-up with her primary care or dentist for re-evaluation of her symptoms.  - The patient has no red flags for Jonathan's or serious dental infection at this juncture. The patient doesn't appear toxic, nor has rapid progression of symptoms. Patient isn't immunocompromised. Patient has no SOB nor trouble swallowing. Patient doesn't appear dehydrated nor demonstrates trismus on exam.        There are no obvious limitations to social determinants of care.   Nursing note reviewed.   Vitals reviewed.   Orders placed by myself and/or advanced practitioner / resident.    Previous chart was reviewed  No language barrier.   History obtained from patient.    There are no limitations to the history obtained:     Past Medical: Past Surgical:    has a past medical history of Asthma and Seizures (HCC).  has no past surgical history on file.   Social: Cardiac (Echo/Cath)   Social History     Substance and Sexual Activity   Alcohol Use Not Currently     Social History     Tobacco Use   Smoking Status Every Day    Current packs/day: 0.50    Types:  Cigarettes   Smokeless Tobacco Never     Social History     Substance and Sexual Activity   Drug Use Yes    Types: Marijuana    No results found for this or any previous visit.    No results found for this or any previous visit.    No results found for this or any previous visit.     Labs: Imaging:   Labs Reviewed - No data to display No orders to display      Medications: Code Status:   Medications - No data to display Code Status: No Order  Advance Directive and Living Will:      Power of :    POLST:     No orders of the defined types were placed in this encounter.    Time reflects when diagnosis was documented in both MDM as applicable and the Disposition within this note       Time User Action Codes Description Comment    5/12/2024  3:33 PM Juan Carlos Christianson Add [K04.7] Dental infection     5/12/2024  3:33 PM Juan Carlos Christianson Add [K08.89] Dentalgia           ED Disposition       ED Disposition   Discharge    Condition   Stable    Date/Time   Sun May 12, 2024  3:33 PM    Comment   Sanjana Winchester discharge to home/self care.                   Follow-up Information    None       Patient's Medications   Discharge Prescriptions    AMOXICILLIN (AMOXIL) 500 MG CAPSULE    Take 1 capsule (500 mg total) by mouth every 12 (twelve) hours for 10 days       Start Date: 5/12/2024 End Date: 5/22/2024       Order Dose: 500 mg       Quantity: 20 capsule    Refills: 0    CHLORHEXIDINE (PERIDEX) 0.12 % SOLUTION    Apply 15 mL to the mouth or throat 2 (two) times a day       Start Date: 5/12/2024 End Date: --       Order Dose: 15 mL       Quantity: 120 mL    Refills: 0     No discharge procedures on file.  Prior to Admission Medications   Prescriptions Last Dose Informant Patient Reported? Taking?   albuterol (ProAir HFA) 90 mcg/act inhaler   No No   Sig: Inhale 2 puffs every 4 (four) hours as needed for wheezing   albuterol (ProAir HFA) 90 mcg/act inhaler   No No   Sig: Inhale 2 puffs every 4 (four) hours as needed for  "wheezing   benzonatate (TESSALON PERLES) 100 mg capsule   No No   Sig: Take 1 capsule (100 mg total) by mouth every 8 (eight) hours   budesonide (Pulmicort) 0.5 mg/2 mL nebulizer solution   No No   Sig: Take 2 mL (0.5 mg total) by nebulization 2 (two) times a day Rinse mouth after use.   lidocaine (Lidoderm) 5 %   No No   Sig: Apply 2 patches topically over 12 hours daily for 7 days Remove & Discard patch within 12 hours or as directed by MD      Facility-Administered Medications: None                        Portions of the record may have been created with voice recognition software. Occasional wrong word or \"sound a like\" substitutions may have occurred due to the inherent limitations of voice recognition software. Read the chart carefully and recognize, using context, where substitutions have occurred.    Electronically signed by:  Samuel Fernandez  "